# Patient Record
Sex: MALE | Race: WHITE | NOT HISPANIC OR LATINO | Employment: FULL TIME | URBAN - NONMETROPOLITAN AREA
[De-identification: names, ages, dates, MRNs, and addresses within clinical notes are randomized per-mention and may not be internally consistent; named-entity substitution may affect disease eponyms.]

---

## 2017-05-17 ENCOUNTER — OFFICE VISIT (OUTPATIENT)
Dept: URGENT CARE | Facility: PHYSICIAN GROUP | Age: 27
End: 2017-05-17
Payer: COMMERCIAL

## 2017-05-17 VITALS
OXYGEN SATURATION: 99 % | HEART RATE: 65 BPM | TEMPERATURE: 97.2 F | DIASTOLIC BLOOD PRESSURE: 78 MMHG | WEIGHT: 162.2 LBS | RESPIRATION RATE: 16 BRPM | HEIGHT: 71 IN | BODY MASS INDEX: 22.71 KG/M2 | SYSTOLIC BLOOD PRESSURE: 120 MMHG

## 2017-05-17 DIAGNOSIS — H69.91 EUSTACHIAN TUBE DYSFUNCTION, RIGHT: ICD-10-CM

## 2017-05-17 PROCEDURE — 99203 OFFICE O/P NEW LOW 30 MIN: CPT | Performed by: PHYSICIAN ASSISTANT

## 2017-05-17 RX ORDER — GUAIFENESIN AND PSEUDOEPHEDRINE HCL 1200; 120 MG/1; MG/1
1 TABLET, EXTENDED RELEASE ORAL 2 TIMES DAILY PRN
Qty: 30 TAB | Refills: 0 | Status: SHIPPED | OUTPATIENT
Start: 2017-05-17 | End: 2019-01-16

## 2017-05-17 NOTE — MR AVS SNAPSHOT
"Mattian Key   2017 3:50 PM   Office Visit   MRN: [id number]    Department:  [facility]   Dept Phone:  [phone]    Description:  Male : 1990   Provider:  Jerson Soto PA-C           Reason for Visit     Otalgia R ear      Allergies as of 2017     Allergen Noted Reactions    Penicillins 2017   Shortness of Breath      You were diagnosed with     Eustachian tube dysfunction, right   [[id number]]         Vital Signs     Blood Pressure Pulse Temperature Respirations Height Weight    120/78 mmHg 65 36.2 °C (97.2 °F) 16 1.803 m (5' 11\") 73.573 kg (162 lb 3.2 oz)    Body Mass Index Oxygen Saturation Smoking Status             22.63 kg/m2 99% Never Smoker          Basic Information     Date Of Birth Sex Race Ethnicity Preferred Language    1990 Male White Non- English      Health Maintenance     Patient has no pending health maintenance at this time      Current Immunizations     No immunizations on file.      Below and/or attached are the medications your provider expects you to take. Review all of your home medications and newly ordered medications with your provider and/or pharmacist. Follow medication instructions as directed by your provider and/or pharmacist. Please keep your medication list with you and share with your provider. Update the information when medications are discontinued, doses are changed, or new medications (including over-the-counter products) are added; and carry medication information at all times in the event of emergency situations     Allergies:  PENICILLINS - Shortness of Breath               Medications  Valid as of: May 17, 2017 -  4:18 PM    Generic Name Brand Name Tablet Size Instructions for use    Pseudoephedrine-Guaifenesin (TABLET SR 12 HR) Pseudoephedrine-Guaifenesin 120-1200 MG Take 1 Tab by mouth 2 times a day as needed.        .                 Medicines prescribed today were sent to:     [facility] KALA SHERMAN " - [address]    [address] [place] [address]    Phone: [phone] Fax: [phone]    Open 24 Hours?: No      Medication refill instructions:       If your prescription bottle indicates you have medication refills left, it is not necessary to call your provider’s office. Please contact your pharmacy and they will refill your medication.    If your prescription bottle indicates you do not have any refills left, you may request refills at any time through one of the following ways: The online [email or web address] system (except Urgent Care), by calling your provider’s office, or by asking your pharmacy to contact your provider’s office with a refill request. Medication refills are processed only during regular business hours and may not be available until the next business day. Your provider may request additional information or to have a follow-up visit with you prior to refilling your medication.   *Please Note: Medication refills are assigned a new Rx number when refilled electronically. Your pharmacy may indicate that no refills were authorized even though a new prescription for the same medication is available at the pharmacy. Please request the medicine by name with the pharmacy before contacting your provider for a refill.           [email or web address] Access Code: [email or web address]  Expires: 6/16/2017  4:18 PM    Your email address is not on file at [employer].  Email Addresses are required for you to sign up for [email or web address], please contact [phone] to verify your personal information and to provide your email address prior to attempting to register for [email or web address].    [address] [place] [address]    [email or web address]  A secure, online tool to manage your health information     [employer]’s [email or web address]® is a secure, online tool that connects you to your personalized health information from the privacy of your home -- day or night - making it very easy for you to manage your healthcare.  Once the activation process is completed, you can even access your medical information using the [employer] faiza, which is available for free in the Apple Faiza store or Google Play store.     To learn more about [employer] visit [email or web address]    There are two levels of access available (as shown below):   My Chart Features  [facility] Doctor [facility]  Specialists [facility]  Urgent  Care Non-[facility] Primary Care Doctor   Email your healthcare team securely and privately 24/7 X X X    Manage appointments: schedule your next appointment; view details of past/upcoming appointments X      Request prescription refills. X      View recent personal medical records, including lab and immunizations X X X X   View health record, including health history, allergies, medications X X X X   Read reports about your outpatient visits, procedures, consult and ER notes X X X X   See your discharge summary, which is a recap of your hospital and/or ER visit that includes your diagnosis, lab results, and care plan X X  X     How to register for [employer]  Once your e-mail address has been verified, follow the following steps to sign up for [employer]     1. Go to  [email or web address]  2. Click on the Sign Up Now box, which takes you to the New Member Sign Up page. You will need to provide the following information:  a. Enter your [employer] Access Code exactly as it appears at the top of this page. (You will not need to use this code after you’ve completed the sign-up process. If you do not sign up before the expiration date, you must request a new code.)   b. Enter your date of birth.   c. Enter your home email address.   d. Click Submit, and follow the next screen’s instructions.  3. Create a Avitidet ID. This will be your [employer] login ID and cannot be changed, so think of one that is secure and easy to remember.  4. Create a [employer] password. You can change your password at any time.  5. Enter your Password Reset Question and  Answer. This can be used at a later time if you forget your password.   6. Enter your e-mail address. This allows you to receive e-mail notifications when new information is available in [employer].  7. Click Sign Up. You can now view your health information.    For assistance activating your [employer] account, call [phone]

## 2017-05-17 NOTE — PROGRESS NOTES
"Chief Complaint   Patient presents with   • Otalgia     R ear       HISTORY OF PRESENT ILLNESS: Patient is a 27 y.o. male who presents today because he has a 2 to three-day history of right-sided ear pain. Denies any fevers, chills, nausea, vomiting or diarrhea. He has been taking some over-the-counter ibuprofen intermittently and that helps a little bit.    There are no active problems to display for this patient.      Allergies:Penicillins    Current Outpatient Prescriptions Ordered in [facility]   Medication Sig Dispense Refill   • Pseudoephedrine-Guaifenesin (MUCINEX D) 120-1200 MG TABLET SR 12 HR Take 1 Tab by mouth 2 times a day as needed. 30 Tab 0     No current Epic-ordered facility-administered medications on file.       No past medical history on file.    Social History   Substance Use Topics   • Smoking status: Never Smoker    • Smokeless tobacco: None   • Alcohol Use: None       No family status information on file.   No family history on file.    ROS:  Review of Systems   Constitutional: Negative for fever, chills, weight loss and malaise/fatigue.   HENT: Positive for right ear pain, no nosebleeds, congestion, sore throat and neck pain.    Eyes: Negative for blurred vision.   Respiratory: Negative for cough, sputum production, shortness of breath and wheezing.    Cardiovascular: Negative for chest pain, palpitations, orthopnea and leg swelling.     Exam:  Blood pressure 120/78, pulse 65, temperature 36.2 °C (97.2 °F), resp. rate 16, height 1.803 m (5' 11\"), weight 73.573 kg (162 lb 3.2 oz), SpO2 99 %.  General:  Well nourished, well developed male in NAD  Head:Normocephalic, atraumatic  Eyes: PERRLA, EOM within normal limits, no conjunctival injection, no scleral icterus, visual fields and acuity grossly intact.  Ears: Normal shape and symmetry, no tenderness, no discharge. External canals are without any significant edema or erythema. Tympanic membranes are without any inflammation, moderate amount of cloudy " fluid behind the right tympanic membrane. Gross auditory acuity is intact  Nose: Symmetrical without tenderness, no discharge.  Mouth: reasonable hygiene, no erythema exudates or tonsillar enlargement.  Neck: no masses, range of motion within normal limits, no tracheal deviation. No obvious thyroid enlargement.  Pulmonary: chest is symmetrical with respiration, no wheezes, crackles, or rhonchi.  Cardiovascular: regular rate and rhythm without murmurs, rubs, or gallops.  Extremities: no clubbing, cyanosis, or edema.    Please note that this dictation was created using voice recognition software. I have made every reasonable attempt to correct obvious errors, but I expect that there are errors of grammar and possibly content that I did not discover before finalizing the note.    Assessment/Plan:  1. Eustachian tube dysfunction, right  Pseudoephedrine-Guaifenesin (MUCINEX D) 120-1200 MG TABLET SR 12 HR        Followup with primary care in the next 7-10 days if not significantly improving, return to the urgent care or go to the emergency room sooner for any worsening of symptoms.

## 2017-05-17 NOTE — Clinical Note
May 17, 2017         Patient: Skinny Mackey   YOB: 1990   Date of Visit: 5/17/2017           To Whom it May Concern:    Skinny Mackey was seen in my clinic on 5/17/2017. He may return to work on 05/18/2017, please excuse any recent absence.    If you have any questions or concerns, please don't hesitate to call.        Sincerely,           Jerson Soto PA-C  Electronically Signed

## 2017-07-13 ENCOUNTER — NON-PROVIDER VISIT (OUTPATIENT)
Dept: URGENT CARE | Facility: PHYSICIAN GROUP | Age: 27
End: 2017-07-13

## 2017-07-13 DIAGNOSIS — Z02.1 PRE-EMPLOYMENT DRUG SCREENING: ICD-10-CM

## 2017-07-13 LAB
AMP AMPHETAMINE: NORMAL
COC COCAINE: NORMAL
INT CON NEG: NORMAL
INT CON POS: NORMAL
MET METHAMPHETAMINES: NORMAL
OPI OPIATES: NORMAL
PCP PHENCYCLIDINE: NORMAL
POC DRUG COMMENT 753798-OCCUPATIONAL HEALTH: NORMAL
THC: NORMAL

## 2017-07-13 PROCEDURE — 80305 DRUG TEST PRSMV DIR OPT OBS: CPT | Performed by: PHYSICIAN ASSISTANT

## 2017-07-19 ENCOUNTER — NON-PROVIDER VISIT (OUTPATIENT)
Dept: OCCUPATIONAL MEDICINE | Facility: CLINIC | Age: 27
End: 2017-07-19
Payer: COMMERCIAL

## 2017-07-19 ENCOUNTER — NON-PROVIDER VISIT (OUTPATIENT)
Dept: OCCUPATIONAL MEDICINE | Facility: CLINIC | Age: 27
End: 2017-07-19

## 2017-07-19 DIAGNOSIS — Z02.83 ENCOUNTER FOR DRUG SCREENING: ICD-10-CM

## 2017-07-19 PROCEDURE — 8911 PR MRO FEE: Performed by: INTERNAL MEDICINE

## 2017-07-19 NOTE — MR AVS SNAPSHOT
Skinny Mackey   2017 11:20 AM   Appointment   MRN: [id number]    Department:  [facility]   Dept Phone:  [phone]    Description:  Male : 1990   Provider:  OH NON RENCEASAR SUAREZ           Allergies as of 2017     Allergen Noted Reactions    Penicillins 2017   Shortness of Breath      Vital Signs     Smoking Status                   Never Smoker            Basic Information     Date Of Birth Sex Race Ethnicity Preferred Language    1990 Male White Non- English      Health Maintenance        Date Due Completion Dates    IMM HEP B VACCINE (1 of 3 - Primary Series) 1990 ---    IMM HEP A VACCINE (1 of 2 - Standard Series) 5/10/1992 ---    IMM HPV VACCINE (1 of 3 - Male 3 Dose Series) 5/10/2002 ---    IMM VARICELLA (CHICKENPOX) VACCINE (1 of 2 - 2 Dose Adolescent Series) 5/10/2004 ---    IMM DTaP/Tdap/Td Vaccine (1 - Tdap) 5/10/2010 ---    IMM INFLUENZA (1) 2017 ---            Current Immunizations     No immunizations on file.      Below and/or attached are the medications your provider expects you to take. Review all of your home medications and newly ordered medications with your provider and/or pharmacist. Follow medication instructions as directed by your provider and/or pharmacist. Please keep your medication list with you and share with your provider. Update the information when medications are discontinued, doses are changed, or new medications (including over-the-counter products) are added; and carry medication information at all times in the event of emergency situations     Allergies:  PENICILLINS - Shortness of Breath               Medications  Valid as of: 2017 - 10:41 AM    Generic Name Brand Name Tablet Size Instructions for use    Pseudoephedrine-Guaifenesin (TABLET SR 12 HR) Pseudoephedrine-Guaifenesin 120-1200 MG Take 1 Tab by mouth 2 times a day as needed.        .                 Medicines prescribed today were sent to:     [facility] [address] [place] [address] - [address]    [address] [address]    Phone: [phone] Fax: [phone]    Open 24 Hours?: No      Medication refill instructions:       If your prescription bottle indicates you have medication refills left, it is not necessary to call your provider’s office. Please contact your pharmacy and they will refill your medication.    If your prescription bottle indicates you do not have any refills left, you may request refills at any time through one of the following ways: The online [employer] system (except Urgent Care), by calling your provider’s office, or by asking your pharmacy to contact your provider’s office with a refill request. Medication refills are processed only during regular business hours and may not be available until the next business day. Your provider may request additional information or to have a follow-up visit with you prior to refilling your medication.   *Please Note: Medication refills are assigned a new Rx number when refilled electronically. Your pharmacy may indicate that no refills were authorized even though a new prescription for the same medication is available at the pharmacy. Please request the medicine by name with the pharmacy before contacting your provider for a refill.           [employer] Access Code: [email or web address]  Expires: 8/12/2017  1:40 PM    [employer]  A secure, online tool to manage your health information     [employer]’s [employer]® is a secure, online tool that connects you to your personalized health information from the privacy of your home -- day or night - making it very easy for you to manage your healthcare. Once the activation process is completed, you can even access your medical information using the [employer] faiza, which is available for free in the Apple Faiza store or Google Play store.     [employer] provides the following levels of access (as shown below):   My Chart Features   [facility]  Primary Care Doctor [facility]  Specialists [facility]  Urgent  Care Non-[facility]  Primary Care  Doctor   Email your healthcare team securely and privately 24/7 X X X    Manage appointments: schedule your next appointment; view details of past/upcoming appointments X      Request prescription refills. X      View recent personal medical records, including lab and immunizations X X X X   View health record, including health history, allergies, medications X X X X   Read reports about your outpatient visits, procedures, consult and ER notes X X X X   See your discharge summary, which is a recap of your hospital and/or ER visit that includes your diagnosis, lab results, and care plan. X X       How to register for Justin.TV:  1. Go to  [email or web address].  2. Click on the Sign Up Now box, which takes you to the New Member Sign Up page. You will need to provide the following information:  a. Enter your Justin.TV Access Code exactly as it appears at the top of this page. (You will not need to use this code after you’ve completed the sign-up process. If you do not sign up before the expiration date, you must request a new code.)   b. Enter your date of birth.   c. Enter your home email address.   d. Click Submit, and follow the next screen’s instructions.  3. Create a Justin.TV ID. This will be your Justin.TV login ID and cannot be changed, so think of one that is secure and easy to remember.  4. Create a Justin.TV password. You can change your password at any time.  5. Enter your Password Reset Question and Answer. This can be used at a later time if you forget your password.   6. Enter your e-mail address. This allows you to receive e-mail notifications when new information is available in Justin.TV.  7. Click Sign Up. You can now view your health information.    For assistance activating your Justin.TV account, call [phone]

## 2017-08-08 NOTE — PROGRESS NOTES
Non-conclusive UDS follow up.  MRO confirmation fees need to be charged.  MRO report date 7/19/17

## 2018-06-18 ENCOUNTER — NON-PROVIDER VISIT (OUTPATIENT)
Dept: URGENT CARE | Facility: PHYSICIAN GROUP | Age: 28
End: 2018-06-18

## 2018-06-18 DIAGNOSIS — Z02.1 PRE-EMPLOYMENT DRUG SCREENING: ICD-10-CM

## 2018-06-18 LAB
AMP AMPHETAMINE: NORMAL
BAR BARBITURATES: NORMAL
BZO BENZODIAZEPINES: NORMAL
COC COCAINE: NORMAL
INT CON NEG: NORMAL
INT CON POS: NORMAL
MDMA ECSTASY: NORMAL
MET METHAMPHETAMINES: NORMAL
MTD METHADONE: NORMAL
OPI OPIATES: NORMAL
OXY OXYCODONE: NORMAL
PCP PHENCYCLIDINE: NORMAL
POC URINE DRUG SCREEN OCDRS: NORMAL
THC: NORMAL

## 2018-06-18 PROCEDURE — 80305 DRUG TEST PRSMV DIR OPT OBS: CPT | Performed by: PHYSICIAN ASSISTANT

## 2018-06-29 ENCOUNTER — NON-PROVIDER VISIT (OUTPATIENT)
Dept: URGENT CARE | Facility: PHYSICIAN GROUP | Age: 28
End: 2018-06-29

## 2018-06-29 DIAGNOSIS — Z02.1 PRE-EMPLOYMENT DRUG SCREENING: ICD-10-CM

## 2018-06-29 LAB
AMP AMPHETAMINE: NORMAL
COC COCAINE: NORMAL
INT CON NEG: NEGATIVE
INT CON POS: POSITIVE
MET METHAMPHETAMINES: NORMAL
OPI OPIATES: NORMAL
PCP PHENCYCLIDINE: NORMAL
POC DRUG COMMENT 753798-OCCUPATIONAL HEALTH: NORMAL
THC: NORMAL

## 2018-06-29 PROCEDURE — 80305 DRUG TEST PRSMV DIR OPT OBS: CPT | Performed by: FAMILY MEDICINE

## 2018-07-09 ENCOUNTER — NON-PROVIDER VISIT (OUTPATIENT)
Dept: OCCUPATIONAL MEDICINE | Facility: CLINIC | Age: 28
End: 2018-07-09

## 2018-07-09 PROCEDURE — 8911 PR MRO FEE: Performed by: INTERNAL MEDICINE

## 2018-10-28 ENCOUNTER — APPOINTMENT (OUTPATIENT)
Dept: RADIOLOGY | Facility: MEDICAL CENTER | Age: 28
End: 2018-10-28
Attending: EMERGENCY MEDICINE

## 2018-10-28 ENCOUNTER — HOSPITAL ENCOUNTER (EMERGENCY)
Facility: MEDICAL CENTER | Age: 28
End: 2018-10-28
Attending: EMERGENCY MEDICINE

## 2018-10-28 VITALS
SYSTOLIC BLOOD PRESSURE: 134 MMHG | WEIGHT: 158.73 LBS | HEIGHT: 71 IN | OXYGEN SATURATION: 99 % | DIASTOLIC BLOOD PRESSURE: 79 MMHG | HEART RATE: 84 BPM | BODY MASS INDEX: 22.22 KG/M2 | TEMPERATURE: 98.2 F | RESPIRATION RATE: 16 BRPM

## 2018-10-28 DIAGNOSIS — S63.91XA SPRAIN OF RIGHT HAND, INITIAL ENCOUNTER: ICD-10-CM

## 2018-10-28 DIAGNOSIS — S63.501A SPRAIN OF RIGHT WRIST, INITIAL ENCOUNTER: ICD-10-CM

## 2018-10-28 PROCEDURE — 73120 X-RAY EXAM OF HAND: CPT | Mod: RT

## 2018-10-28 PROCEDURE — 73100 X-RAY EXAM OF WRIST: CPT | Mod: RT

## 2018-10-28 PROCEDURE — 99284 EMERGENCY DEPT VISIT MOD MDM: CPT

## 2018-10-28 ASSESSMENT — PAIN SCALES - GENERAL
PAINLEVEL_OUTOF10: 4
PAINLEVEL_OUTOF10: 5

## 2018-10-28 NOTE — ED TRIAGE NOTES
"Chief Complaint   Patient presents with   • T-5000 GLF     tripped on R hand   • Hand Injury     R wrist, hand pain     Limited ROM to R wrist.  Pulses strong.     /68   Pulse 86   Temp 36.8 °C (98.2 °F)   Resp 16   Ht 1.803 m (5' 11\")   Wt 72 kg (158 lb 11.7 oz)   SpO2 99%   BMI 22.14 kg/m²     Pt Informed regarding triage process and verbalized understanding to inform triage tech or RN for any changes in condition.  Placed in lobby.    "

## 2018-10-29 NOTE — DISCHARGE INSTRUCTIONS
Joint Sprain  A sprain is a tear or stretch in the ligaments that hold a joint together. Severe sprains may need as long as 3-6 weeks of immobilization and/or exercises to heal completely. Sprained joints should be rested and protected. If not, they can become unstable and prone to re-injury. Proper treatment can reduce your pain, shorten the period of disability, and reduce the risk of repeated injuries.  TREATMENT   · Rest and elevate the injured joint to reduce pain and swelling.  · Apply ice packs to the injury for 20-30 minutes every 2-3 hours for the next 2-3 days.  · Keep the injury wrapped in a compression bandage or splint as long as the joint is painful or as instructed by your caregiver.  · Do not use the injured joint until it is completely healed to prevent re-injury and chronic instability. Follow the instructions of your caregiver.  · Long-term sprain management may require exercises and/or treatment by a physical therapist. Taping or special braces may help stabilize the joint until it is completely better.  SEEK MEDICAL CARE IF:   · You develop increased pain or swelling of the joint.  · You develop increasing redness and warmth of the joint.  · You develop a fever.  · It becomes stiff.  · Your hand or foot gets cold or numb.  Document Released: 01/25/2006 Document Revised: 03/11/2013 Document Reviewed: 01/04/2010  [employer]® Patient Information ©2014 [employer].

## 2018-10-29 NOTE — ED PROVIDER NOTES
"ED Provider Note  CHIEF COMPLAINT  Chief Complaint   Patient presents with   • T-5000 GLF     tripped on R hand   • Hand Injury     R wrist, hand pain       HPI  Skinny Mackey is a 28 y.o. male who presents planes of pain to his right hand and right wrist after tripping with a ground-level fall today.  No other injury.  No injury to the head and of the neck near the chest.  No injury to the back nor the other extremities.    REVIEW OF SYSTEMS  See HPI for further details.  Otherwise healthy no other complaint.  No injury to the head and of the neck of the chest and of the other extremities.  PAST MEDICAL HISTORY  Negative    FAMILY HISTORY  Negative    SOCIAL HISTORY  Negative    ALLERGIES    PHYSICAL EXAM  VITAL SIGNS: /68   Pulse 86   Temp 36.8 °C (98.2 °F)   Resp 16   Ht 1.803 m (5' 11\")   Wt 72 kg (158 lb 11.7 oz)   SpO2 99%   BMI 22.14 kg/m²   vital signs are noted  Constitutional: Alert healthy-appearing adult in no distress   HENT: Normocephalic   Eyes: No sign of trauma  Neck: No neck pain  Cardiovascular: Regular rhythm   Thorax & Lungs: No respiratory distress   Abdomen: No pain  Skin: Warm, Dry, No rash.   Back: No back pain or tenderness    Musculoskeletal: Into the right wrist and right hand.  No obvious deformity or swelling.  Good radial pulse.  Full range of motion of the hand and wrist.  Neurologic: Alert  Normal motor function,  No obvious focal deficits noted.   Psychiatric: Affect normal, and mood normal.       RADIOLOGY/PROCEDURES  DX-WRIST-LIMITED 2- RIGHT   Final Result      No acute osseous abnormality.      DX-HAND 2- RIGHT   Final Result      No acute osseous abnormality.            COURSE & MEDICAL DECISION MAKING  Fall and injured his right hand and wrist.  An x-ray of the right hand and wrist have been obtained.    X-ray of the hand and wrist shows no fracture.  He has a right wrist sprain.  Velcro wrist splint has been applied and is in good position.  He can take Tylenol " or Motrin for the pain.  He is been given orthopedic follow-up.  FINAL IMPRESSION  1.  Sprain         Electronically signed by: Gary Gansert, 10/28/2018 5:09 PM

## 2019-01-16 ENCOUNTER — OFFICE VISIT (OUTPATIENT)
Dept: URGENT CARE | Facility: CLINIC | Age: 29
End: 2019-01-16
Payer: COMMERCIAL

## 2019-01-16 VITALS
OXYGEN SATURATION: 97 % | TEMPERATURE: 98.3 F | HEART RATE: 96 BPM | WEIGHT: 152 LBS | SYSTOLIC BLOOD PRESSURE: 140 MMHG | DIASTOLIC BLOOD PRESSURE: 86 MMHG | RESPIRATION RATE: 14 BRPM | BODY MASS INDEX: 22.51 KG/M2 | HEIGHT: 69 IN

## 2019-01-16 DIAGNOSIS — Z02.1 PRE-EMPLOYMENT DRUG SCREENING: ICD-10-CM

## 2019-01-16 DIAGNOSIS — Z02.1 PHYSICAL EXAM, PRE-EMPLOYMENT: ICD-10-CM

## 2019-01-16 PROCEDURE — 8907 PR URINE COLLECT ONLY: Performed by: NURSE PRACTITIONER

## 2019-01-16 PROCEDURE — 8915 PR COMPREHENSIVE PHYSICAL: Performed by: NURSE PRACTITIONER

## 2019-01-16 NOTE — PROGRESS NOTES
28 y.o. male comes in for a preemployment physical. No major medical history, no chronic conditions, no chronic medications. No history of asthma, heart disease, murmurs, seizure disorder or syncopal episodes with activity. Blood pressure slightly elevated today, denies history of the same. Instructed to decrease salt in diet, perform daily exercise, and follow up with PCP. Please see the chart for further information, however normal physical exam, cleared for employment without restrictions.        FRANDY Jones.

## 2020-01-15 ENCOUNTER — OFFICE VISIT (OUTPATIENT)
Dept: URGENT CARE | Facility: PHYSICIAN GROUP | Age: 30
End: 2020-01-15
Payer: COMMERCIAL

## 2020-01-15 VITALS
DIASTOLIC BLOOD PRESSURE: 82 MMHG | TEMPERATURE: 98.2 F | HEIGHT: 71 IN | OXYGEN SATURATION: 97 % | WEIGHT: 155 LBS | RESPIRATION RATE: 12 BRPM | SYSTOLIC BLOOD PRESSURE: 152 MMHG | BODY MASS INDEX: 21.7 KG/M2 | HEART RATE: 82 BPM

## 2020-01-15 DIAGNOSIS — G43.809 OTHER MIGRAINE WITHOUT STATUS MIGRAINOSUS, NOT INTRACTABLE: ICD-10-CM

## 2020-01-15 PROCEDURE — 99214 OFFICE O/P EST MOD 30 MIN: CPT | Performed by: PHYSICIAN ASSISTANT

## 2020-01-15 RX ORDER — OXYCODONE AND ACETAMINOPHEN 2.5; 325 MG/1; MG/1
TABLET ORAL
COMMUNITY
Start: 2019-10-30 | End: 2023-04-12

## 2020-01-15 RX ORDER — ONDANSETRON 4 MG/1
4 TABLET, FILM COATED ORAL EVERY 8 HOURS PRN
Qty: 20 TAB | Refills: 0 | Status: SHIPPED | OUTPATIENT
Start: 2020-01-15 | End: 2023-04-12

## 2020-01-15 RX ORDER — KETOROLAC TROMETHAMINE 30 MG/ML
60 INJECTION, SOLUTION INTRAMUSCULAR; INTRAVENOUS ONCE
Status: COMPLETED | OUTPATIENT
Start: 2020-01-15 | End: 2020-01-15

## 2020-01-15 RX ORDER — HYDROCODONE BITARTRATE AND ACETAMINOPHEN 5; 325 MG/1; MG/1
TABLET ORAL
COMMUNITY
Start: 2019-11-03 | End: 2023-04-12

## 2020-01-15 RX ORDER — CLINDAMYCIN HYDROCHLORIDE 300 MG/1
CAPSULE ORAL
COMMUNITY
Start: 2019-10-30 | End: 2020-04-12

## 2020-01-15 RX ADMIN — KETOROLAC TROMETHAMINE 60 MG: 30 INJECTION, SOLUTION INTRAMUSCULAR; INTRAVENOUS at 13:18

## 2020-01-15 NOTE — LETTER
January 15, 2020         Patient: Skinny Mackey   YOB: 1990   Date of Visit: 1/15/2020           To Whom it May Concern:    Skinny Mackey was seen in my clinic on 1/15/2020. He Return to work 01/16/20.    If you have any questions or concerns, please don't hesitate to call.        Sincerely,           Jerson Soto P.A.-C.  Electronically Signed

## 2020-01-15 NOTE — PROGRESS NOTES
Chief Complaint   Patient presents with   • Head Pain     x 1 day.  Pt. complains of nausea and migraine with aura.         HISTORY OF PRESENT ILLNESS: Patient is a 29 y.o. male who presents today because he woke up about an hour and a half or so ago with a migraine.  He states that he has been battling migraines for years.  His current symptoms are head pain bilaterally and behind his eyes.  He has had nausea, photophobia.  Denies any numbness or tingling in his face or extremities.  He has taken some Excedrin for which did not help    There are no active problems to display for this patient.      Allergies:Penicillins    Current Outpatient Medications Ordered in Epic   Medication Sig Dispense Refill   • asa/apap/caffeine (EXCEDRIN) 250-250-65 MG Tab Take 1 Tab by mouth every 6 hours as needed for Headache.     • ondansetron (ZOFRAN) 4 MG Tab tablet Take 1 Tab by mouth every 8 hours as needed for Nausea/Vomiting. 20 Tab 0     Current Facility-Administered Medications Ordered in Epic   Medication Dose Route Frequency Provider Last Rate Last Dose   • ketorolac (TORADOL) injection 60 mg  60 mg Intramuscular Once Jerson Soto P.A.-C.           History reviewed. No pertinent past medical history.    Social History     Tobacco Use   • Smoking status: Never Smoker   • Smokeless tobacco: Never Used   Substance Use Topics   • Alcohol use: No   • Drug use: No       No family status information on file.   History reviewed. No pertinent family history.    ROS:  Review of Systems   Constitutional: Negative for fever, chills, weight loss and malaise/fatigue.   HENT: Negative for ear pain, nosebleeds, congestion, sore throat and neck pain.    Eyes: Negative for blurred vision.   Respiratory: Negative for cough, sputum production, shortness of breath and wheezing.    Cardiovascular: Negative for chest pain, palpitations, orthopnea and leg swelling.   Gastrointestinal: Negative for heartburn, positive for nausea, no vomiting  "and abdominal pain.   Genitourinary: Negative for dysuria, urgency and frequency.     Exam:  /82   Pulse 82   Temp 36.8 °C (98.2 °F) (Temporal)   Resp 12   Ht 1.803 m (5' 11\")   Wt 70.3 kg (155 lb)   SpO2 97%   General:  Well nourished, well developed male in NAD  Head:Normocephalic, atraumatic  Eyes: PERRLA, EOM within normal limits, no conjunctival injection, no scleral icterus, visual fields and acuity grossly intact.  Nose: Symmetrical without tenderness, no discharge.  Mouth: reasonable hygiene, no erythema exudates or tonsillar enlargement.  Neck: no masses, range of motion within normal limits, no tracheal deviation. No obvious thyroid enlargement.  Extremities: no clubbing, cyanosis, or edema.    Please note that this dictation was created using voice recognition software. I have made every reasonable attempt to correct obvious errors, but I expect that there are errors of grammar and possibly content that I did not discover before finalizing the note.    Assessment/Plan:  1. Other migraine without status migrainosus, not intractable  ketorolac (TORADOL) injection 60 mg    ondansetron (ZOFRAN) 4 MG Tab tablet       Followup with primary care in the next 7-10 days if not significantly improving, return to the urgent care or go to the emergency room sooner for any worsening of symptoms.       "

## 2020-02-03 ENCOUNTER — OFFICE VISIT (OUTPATIENT)
Dept: URGENT CARE | Facility: PHYSICIAN GROUP | Age: 30
End: 2020-02-03
Payer: COMMERCIAL

## 2020-02-03 VITALS
BODY MASS INDEX: 20.86 KG/M2 | DIASTOLIC BLOOD PRESSURE: 88 MMHG | HEART RATE: 80 BPM | RESPIRATION RATE: 16 BRPM | WEIGHT: 154 LBS | SYSTOLIC BLOOD PRESSURE: 148 MMHG | HEIGHT: 72 IN | OXYGEN SATURATION: 97 % | TEMPERATURE: 98 F

## 2020-02-03 DIAGNOSIS — J06.9 UPPER RESPIRATORY TRACT INFECTION, UNSPECIFIED TYPE: ICD-10-CM

## 2020-02-03 PROCEDURE — 99213 OFFICE O/P EST LOW 20 MIN: CPT | Performed by: PHYSICIAN ASSISTANT

## 2020-02-03 NOTE — LETTER
February 3, 2020         Patient: Skinny Mackey   YOB: 1990   Date of Visit: 2/3/2020           To Whom it May Concern:    Skinny Mackey was seen in my clinic on 2/3/2020. He may return to work on 2/4/2020.    If you have any questions or concerns, please don't hesitate to call.        Sincerely,           Nancy Landeros P.A.-C.  Electronically Signed

## 2020-02-03 NOTE — PROGRESS NOTES
"Chief Complaint   Patient presents with   • Cough     Cough/URI Sx started this morning.        HISTORY OF PRESENT ILLNESS: Patient is a 29 y.o. male who presents today for the following:    Cough started this morning  Temp \"almost 100\" this morning  + nasal congestion, head pressure  Mild chill  Denies body aches  OTC Meds: ibuprofen/APAP    There are no active problems to display for this patient.      Allergies:Penicillins    Current Outpatient Medications Ordered in Epic   Medication Sig Dispense Refill   • asa/apap/caffeine (EXCEDRIN) 250-250-65 MG Tab Take 1 Tab by mouth every 6 hours as needed for Headache.     • ondansetron (ZOFRAN) 4 MG Tab tablet Take 1 Tab by mouth every 8 hours as needed for Nausea/Vomiting. (Patient not taking: Reported on 2/3/2020) 20 Tab 0   • oxycodone-acetaminophen (PERCOCET) 2.5-325 MG per tablet      • HYDROcodone-acetaminophen (NORCO) 5-325 MG Tab per tablet      • clindamycin (CLEOCIN) 300 MG Cap        No current Epic-ordered facility-administered medications on file.        History reviewed. No pertinent past medical history.    Social History     Tobacco Use   • Smoking status: Never Smoker   • Smokeless tobacco: Never Used   Substance Use Topics   • Alcohol use: No   • Drug use: No       No family status information on file.   History reviewed. No pertinent family history.    Review of Systems:   Constitutional ROS: No unexpected change in weight, No weakness, No fatigue  Eye ROS: No recent significant change in vision, No eye pain, redness, discharge  Ear ROS: No drainage, No tinnitus or vertigo, No recent change in hearing  Mouth/Throat ROS: No teeth or gum problems, No bleeding gums, No tongue complaints  Neck ROS: No swollen glands, No significant pain in neck  Pulmonary ROS: No chronic cough, sputum, or hemoptysis, No dyspnea on exertion, No wheezing  Cardiovascular ROS: No diaphoresis, No edema, No palpitations  Musculoskeletal/Extremities ROS: No peripheral edema, No " pain, redness or swelling on the joints  Hematologic/Lymphatic ROS: chills, fever  Skin/Integumentary ROS: No edema, No evidence of rash, No itching      Exam:  /88   Pulse 80   Temp 36.7 °C (98 °F) (Temporal)   Resp 16   Ht 1.829 m (6')   Wt 69.9 kg (154 lb)   SpO2 97%   General: Well developed, well nourished. No distress.    Eye: PERRL/EOMI; conjunctivae clear, lids normal.  ENMT: Lips without lesions, MMM. Oropharynx is clear. Bilateral TMs are within normal limits.  Pulmonary: Unlabored respiratory effort. Lungs clear to auscultation, no wheezes, no rhonchi.    Cardiovascular: Regular rate and rhythm without murmur.   Neurologic: Grossly nonfocal. No facial asymmetry noted.  Lymph: No cervical lymphadenopathy noted.  Skin: Warm, dry, good turgor. No rashes in visible areas.   Psych: Normal mood. Alert and oriented to person, place and time.    Assessment/Plan:  Discussed likely viral etiology, possibly influenza.  Patient symptoms are relatively mild.  Discussed CDC recommendations for Tamiflu treatment and patient declines treatment. Discussed appropriate over-the-counter symptomatic medication, and when to return to clinic. Follow up for worsening or persistent symptoms.  1. Upper respiratory tract infection, unspecified type     '

## 2020-09-09 ENCOUNTER — OCCUPATIONAL MEDICINE (OUTPATIENT)
Dept: URGENT CARE | Facility: CLINIC | Age: 30
End: 2020-09-09
Payer: COMMERCIAL

## 2020-09-09 VITALS
TEMPERATURE: 98.4 F | DIASTOLIC BLOOD PRESSURE: 80 MMHG | BODY MASS INDEX: 21.67 KG/M2 | OXYGEN SATURATION: 98 % | HEART RATE: 82 BPM | SYSTOLIC BLOOD PRESSURE: 138 MMHG | RESPIRATION RATE: 14 BRPM | HEIGHT: 72 IN | WEIGHT: 160 LBS

## 2020-09-09 DIAGNOSIS — M75.41 IMPINGEMENT SYNDROME OF RIGHT SHOULDER: ICD-10-CM

## 2020-09-09 PROCEDURE — 99203 OFFICE O/P NEW LOW 30 MIN: CPT | Performed by: PHYSICIAN ASSISTANT

## 2020-09-09 NOTE — LETTER
[facility]  [address], [address] [place] [address]  Phone:  [phone] - Fax:  [phone]   [facility] Progress Report and Disability Certification  Date of Service: 9/9/2020   No Show:  No  Date / Time of Next Visit:     Claim Information   Patient Name: Skinny Mackey  Claim Number:     Employer: [employer]  Date of Injury: 9/9/2020     Insurer / TPA: Mell Macedon  ID / SSN:     Occupation: /  Diagnosis: There were no encounter diagnoses.    Medical Information   Related to Industrial Injury? Yes    Subjective Complaints:  HPI:  DOI: 9/9/2020  JOSE: The patient was at work today at [employer] when the injury occurred.  He was walking carrying something in his left hand and swinging his right shoulder.  He felt a pop and a sharp pain in his right shoulder.  The sharp pain gradually subsided and turned to a dull ache.  Currently he denies any numbness tingling weakness or loss of range of motion to the shoulder.  He states his pain has greatly improved and he is in minimal pain at this time.  He informs me his boss recommended he come into the clinic and be checked out today.  There was no traumatic event leading to this injury.  Patient informs me he does have a past medical history significant for right shoulder dislocation back in 2015.  He has had no previous surgeries on the shoulder.  The patient is right-handed.  He denies any second job.    PMH:   No pertinent past medical history to this problem  MEDS:  Medications were reviewed in EMR  ALLERGIES:  Allergies were reviewed in EMR  SOCHX:  Works at [employer]  FH:   No pertinent family history to this problem    Review of systems:  Constitutional: Alert, no acute distress, normal appearance  Cardiovascular: Regular rate and rhythm  Pulmonary: Normal breath sounds no wheezing  Musculoskeletal: Right shoulder pain     Objective Findings:  Constitutional: Pt is oriented to person, place, and time.  Appears well-developed and well-nourished. No distress.   HENT: WNL  Mouth/Throat: Oropharynx is clear and moist.   Eyes: Conjunctivae are normal.   Cardiovascular: Normal rate.    Pulmonary/Chest: Effort normal.   Musculoskeletal: Right shoulder: No obvious deformities, effusion, ecchymosis or erythema.  No tenderness to palpation over any of the bony prominences.  Slight tenderness to palpation over the lateral glenohumeral joint.  Patient has full shoulder flexion/extension/internal and external rotation.  Strength is 5/5 to shoulder flexion/extension/bicep flexion/abduction.  Bilateral upper extremity sensation is full and intact.  Speeds test negative.  Empty can test negative.  Apprehension test negative.  Neurological: Pt is alert and oriented to person, place, and time. Coordination normal.   Skin: Skin is warm. Pt is not diaphoretic. No erythema.   Psychiatric: Pt has a normal mood and affect.  Behavior is normal.      Pre-Existing Condition(s): Right shoulder dislocation in 2015.   Assessment:   Initial Visit    Status: Discharged /  MMI  Permanent Disability:No    Plan:      Diagnostics:      Comments:  May use Tylenol and ibuprofen as needed for pain and inflammation.  Recommended icing 3-4 times a day for 10 to 15 minutes for the next 2 to 3 days.  Recommended stretching and gentle range of motion exercises.  No physical restrictions or limitation  s.  Follow-up in clinic if symptoms worsen or persist.    Disability Information   Status: Released to Full Duty    From:     Through:   Restrictions are:     Physical Restrictions   Sitting:    Standing:    Stooping:    Bending:      Squatting:    Walking:    Climbing:    Pushing:      Pulling:    Other:    Reaching Above Shoulder (L):   Reaching Above Shoulder (R):       Reaching Below Shoulder (L):    Reaching Below Shoulder (R):      Not to exceed Weight Limits   Carrying(hrs):   Weight  Limit(lb):   Lifting(hrs):   Weight  Limit(lb):     Comments: Released to full duty.    Repetitive Actions   Hands: i.e. Fine Manipulations from Grasping:     Feet: i.e. Operating Foot Controls:     Driving / Operate Machinery:     Provider Name:   Rohit Arzola P.A.-C. Physician Signature:  Physician Name:     Clinic Name / Location: [address]  [place] [address] Clinic Phone Number: Dept: [phone]   Appointment Time: 1:15 Pm Visit Start Time: 1:34 PM   Check-In Time:  1:29 Pm Visit Discharge Time: 2:20 PM   Original-Treating Physician or Chiropractor    Page 2-Insurer/TPA    Page 3-Employer    Page 4-Employee

## 2020-09-09 NOTE — LETTER
EMPLOYEE’S CLAIM FOR COMPENSATION/ REPORT OF INITIAL TREATMENT  FORM C-4    EMPLOYEE’S CLAIM - PROVIDE ALL INFORMATION REQUESTED   First Name  Skinny Last Name  Key Birthdate                    1990                Sex  male Claim Number   Home Address  [address] Age  30 y.o. Height  1.829 m (6') Weight  72.6 kg (160 lb) N     [facility] Zip  [address] Telephone  [phone] (home) [phone] (work)   Mailing Address  [address] [facility] Zip  [address] Primary Language Spoken  English    Insurer   Third Party   Shaina/ester Berman   Employee's Occupation (Job Title) When Injury or Occupational Disease Occurred  /    Employer's Name  [employer]  Telephone  [phone]    Employer Address  [address] [place]  [facility]  Zip  [address]    Date of Injury  9/9/2020               Hour of Injury  12:00 PM Date Employer Notified  9/9/2020 Last Day of Work after Injury     or Occupational Disease  9/9/2020 Supervisor to Whom Injury     Reported  Edvin   Address or Location of Accident (if applicable)  [[employer]   What were you doing at the time of accident? (if applicable)  walking/swaying arms    How did this injury or occupational disease occur? (Be specific an answer in detail. Use additional sheet if necessary)  walking/swaying arms them shoulder popped   If you believe that you have an occupational disease, when did you first have knowledge of the disability and it relationship to your employment?  N/A Witnesses to the Accident  none      Nature of Injury or Occupational Disease  Sprain  Part(s) of Body Injured or Affected  Shoulder (R), N/A, N/A    I certify that the above is true and correct to the best of my knowledge and that I have provided this information in order to obtain the benefits of [place]  [place]  I hereby authorize any physician, chiropractor, surgeon, practitioner, or other person, any hospital, including [facility] or [facility], any medical service organization, any insurance company, or other institution or organization to release to each other, any medical or other information, including benefits paid or payable, pertinent to this injury or disease, except information relative to diagnosis, treatment and/or counseling for AIDS, psychological conditions, alcohol or controlled substances, for which I must give specific authorization.  A Photostat of this authorization shall be as valid as the original.     Date   Place   Employee’s Signature   THIS REPORT MUST BE COMPLETED AND MAILED WITHIN 3 WORKING DAYS OF TREATMENT   Place  [facility]  [place]  [facility]   Date  9/9/2020 Diagnosis  No diagnosis found. Is there evidence the injured employee was under the              influence of alcohol and/or another controlled substance at the time of accident?   Hour  1:34 PM Description of Injury or Disease  There were no encounter diagnoses. No   Treatment  May use Tylenol and ibuprofen as needed for pain and inflammation.  Recommended icing 3-4 times a day for 10 to 15 minutes for the next 2 to 3 days.  Recommended stretching and gentle range of motion exercises.  No physical restrictions or limitations.  Follow-up in clinic if symptoms worsen or persist.  Have you advised the patient to remain off work five days or     more? No   X-Ray Findings      If Yes   From Date  To Date      From information given by the employee, together with medical evidence, can you directly connect this injury or occupational disease as job incurred?  Yes If No Full Duty    Yes Modified Duty      Is additional medical care by a physician indicated?  No If Modified Duty, Specify any Limitations  "/ Restrictions      Do you know of any previous injury or disease contributing to this condition or occupational disease?                            Yes  Comments:Patient has previously dislocated his right shoulder back in 2015.  There was no surgical intervention.   Date  9/9/2020 Print Doctor’s Name   Nu Arzola P.A.-C. I certify the employer’s copy of  this form was mailed on:   Address  [address], [address] Insurer’s Use Only     [facility] Zip  [address]    Provider’s Tax ID Number  [id number] Telephone  Dept: [phone]      [email or web address]-NU Ospina P.A.-C.  Signature:     Degree          ORIGINAL-TREATING PHYSICIAN OR CHIROPRACTOR    PAGE 2-INSURER/TPA    PAGE 3-EMPLOYER    PAGE 4-EMPLOYEE        Form C-4 (rev.10/07)          BRIEF DESCRIPTION OF RIGHTS AND BENEFITS  (Pursuant to [place] 616[place])    Notice of Injury or Occupational Disease (Incident Report Form C-1): If an injury or occupational disease (OD) arises out of and in the course of employment, you must provide written notice to your employer as soon as practicable, but no later than 7 days after the accident or OD. Your employer shall maintain a sufficient supply of the required forms.     Claim for Compensation (Form C-4): If medical treatment is sought, the form C-4 is available at the place of initial treatment. A completed \"Claim for Compensation\" (Form C-4) must be filed within 90 days after an accident or OD. The treating physician or chiropractor must, within 3 working days after treatment, complete and mail to the employer, the employer's insurer and third-party , the Claim for Compensation.     Medical Treatment: If you require medical treatment for your on-the-job injury or OD, you may be required to select a physician or chiropractor from a list provided by your workers’ compensation insurer, if it has contracted with an Organization for Managed Care (MCO) or Preferred Provider Organization (PPO) or " providers of health care. If your employer has not entered into a contract with an MCO or PPO, you may select a physician or chiropractor from the Panel of Physicians and Chiropractors. Any medical costs related to your industrial injury or OD will be paid by your insurer.     Temporary Total Disability (TTD): If your doctor has certified that you are unable to work for a period of at least 5 consecutive days, or 5 cumulative days in a 20-day period, or places restrictions on you that your employer does not accommodate, you may be entitled to TTD compensation.     Temporary Partial Disability (TPD): If the wage you receive upon reemployment is less than the compensation for TTD to which you are entitled, the insurer may be required to pay you TPD compensation to make up the difference. TPD can only be paid for a maximum of 24 months.     Permanent Partial Disability (PPD): When your medical condition is stable and there is an indication of a PPD as a result of your injury or OD, within 30 days, your insurer must arrange for an evaluation by a rating physician or chiropractor to determine the degree of your PPD. The amount of your PPD award depends on the date of injury, the results of the PPD evaluation and your age and wage.     Permanent Total Disability (PTD): If you are medically certified by a treating physician or chiropractor as permanently and totally disabled and have been granted a PTD status by your insurer, you are entitled to receive monthly benefits not to exceed 66 2/3% of your average monthly wage. The amount of your PTD payments is subject to reduction if you previously received a PPD award.     Vocational Rehabilitation Services: You may be eligible for vocational rehabilitation services if you are unable to return to the job due to a permanent physical impairment or permanent restrictions as a result of your injury or occupational disease.     Transportation and Per Jumana Reimbursement: You may be  eligible for travel expenses and per debbie associated with medical treatment.     Reopening: You may be able to reopen your claim if your condition worsens after claim closure.     Appeal Process: If you disagree with a written determination issued by the insurer or the insurer does not respond to your request, you may appeal to the Department of Administration, , by following the instructions contained in your determination letter. You must appeal the determination within 70 days from the date of the determination letter at [address], [address], [place] [address], or [address], [place], [place] [address]. If you disagree with the  decision, you may appeal to the Department of Administration, . You must file your appeal within 30 days from the date of the  decision letter at [address], [address], [place], [place] [address], or [address] [facility] [address] [facility] [address], [place], [place] [address]. If you disagree with a decision of an , you may file a petition for judicial review with the District Court. You must do so within 30 days of the Appeal Officer’s decision. You may be represented by an  at your own expense or you may contact the [facility] for possible representation.     [place]  for Injured Workers (NAIW): If you disagree with a  decision, you may request that NAIW represent you without charge at an  Hearing. For information regarding denial of benefits, you may contact the [facility] at: [address], [address], [place] [address], [phone], or [address], [place] [address], [phone]     To File a Complaint with the Division: If you wish to file a complaint with the  of the Division of Industrial Relations (DIR), please contact the Workers’ Compensation Section, [address], [address], [place], [place] [address], telephone [phone], or [address], [address] [facility], [place], [place] [address], telephone [phone].     For assistance with Workers’ Compensation Issues: You may contact the Office of the Governor Consumer Health Assistance, [address] [place] [address], [address], [place], [place] [address], Toll Free 1-613.235.8105, Web site: [email or web address].[email or web address]., E-mail [email or web address].              __________________________________________________________________                              ___________________         Employee Name / Signature                                                                                                                     Date                                                                                                                                                                                       D-2 (rev. 01/20)

## 2020-09-09 NOTE — PROGRESS NOTES
Subjective:      Skinny Mackey is a 30 y.o. male who presents with Shoulder Injury (RT shoulder injury )      HPI:  DOI: 9/9/2020  JOSE: The patient was at work today at [employer] when the injury occurred.  He was walking carrying something in his left hand and swinging his right shoulder.  He felt a pop and a sharp pain in his right shoulder.  The sharp pain gradually subsided and turned to a dull ache.  Currently he denies any numbness tingling weakness or loss of range of motion to the shoulder.  He states his pain has greatly improved and he is in minimal pain at this time.  He informs me his boss recommended he come into the clinic and be checked out today.  There was no traumatic event leading to this injury.  Patient informs me he does have a past medical history significant for right shoulder dislocation back in 2015.  He has had no previous surgeries on the shoulder.  The patient is right-handed.  He denies any second job.    PMH:   No pertinent past medical history to this problem  MEDS:  Medications were reviewed in EMR  ALLERGIES:  Allergies were reviewed in EMR  SOCHX:  Works at [employer]  FH:   No pertinent family history to this problem    Review of systems:  Constitutional: Alert, no acute distress, normal appearance  Cardiovascular: Regular rate and rhythm  Pulmonary: Normal breath sounds no wheezing  Musculoskeletal: Right shoulder pain            Objective:     /80   Pulse 82   Temp 36.9 °C (98.4 °F) (Temporal)   Resp 14   Ht 1.829 m (6')   Wt 72.6 kg (160 lb)   SpO2 98%   BMI 21.70 kg/m²      Physical Exam    Constitutional: Pt is oriented to person, place, and time.  Appears well-developed and well-nourished. No distress.   HENT: WNL  Mouth/Throat: Oropharynx is clear and moist.   Eyes: Conjunctivae are normal.   Cardiovascular: Normal rate.    Pulmonary/Chest: Effort normal.   Musculoskeletal: Right shoulder: No obvious deformities, effusion, ecchymosis or  erythema.  No tenderness to palpation over any of the bony prominences.  Slight tenderness to palpation over the lateral glenohumeral joint.  Patient has full shoulder flexion/extension/internal and external rotation.  Strength is 5/5 to shoulder flexion/extension/bicep flexion/abduction.  Bilateral upper extremity sensation is full and intact.  Speeds test negative.  Empty can test negative.  Apprehension test negative.  Neurological: Pt is alert and oriented to person, place, and time. Coordination normal.   Skin: Skin is warm. Pt is not diaphoretic. No erythema.   Psychiatric: Pt has a normal mood and affect.  Behavior is normal.          Assessment/Plan:        1. Impingement syndrome of right shoulder    May use Tylenol and ibuprofen as needed for pain and inflammation.  Recommended icing 3-4 times a day for 10 to 15 minutes for the next 2 to 3 days.  Recommended stretching and gentle range of motion exercises.  No physical restrictions or limitations.  Follow-up in clinic if symptoms worsen or persist.

## 2021-06-06 ENCOUNTER — HOSPITAL ENCOUNTER (EMERGENCY)
Facility: MEDICAL CENTER | Age: 31
End: 2021-06-06

## 2021-06-06 VITALS
DIASTOLIC BLOOD PRESSURE: 84 MMHG | RESPIRATION RATE: 14 BRPM | SYSTOLIC BLOOD PRESSURE: 135 MMHG | OXYGEN SATURATION: 99 % | HEART RATE: 102 BPM | BODY MASS INDEX: 21.7 KG/M2 | TEMPERATURE: 98 F | HEIGHT: 72 IN

## 2021-06-06 PROCEDURE — 302449 STATCHG TRIAGE ONLY (STATISTIC)

## 2021-06-07 NOTE — ED NOTES
Patients family/significant other arrived to ER [facility], asked about wait time, and told us she would be taking him to the Urgent care. Patient stated he did not want to waste our time, and expressed interest in being seen quicker elsewhere.     Patient encouraged to stay, but was unable to be dissuaded from leaving. Notified to return if symptoms persist or worsen and notified urgent care may not be open.     Signed AMA form and discharged from [facility]

## 2021-10-06 ENCOUNTER — NON-PROVIDER VISIT (OUTPATIENT)
Dept: URGENT CARE | Facility: PHYSICIAN GROUP | Age: 31
End: 2021-10-06

## 2021-10-06 DIAGNOSIS — Z02.1 PRE-EMPLOYMENT DRUG SCREENING: ICD-10-CM

## 2021-10-06 PROCEDURE — 80305 DRUG TEST PRSMV DIR OPT OBS: CPT | Performed by: FAMILY MEDICINE

## 2021-10-27 ENCOUNTER — OFFICE VISIT (OUTPATIENT)
Dept: URGENT CARE | Facility: PHYSICIAN GROUP | Age: 31
End: 2021-10-27

## 2021-10-27 VITALS
BODY MASS INDEX: 20.72 KG/M2 | SYSTOLIC BLOOD PRESSURE: 142 MMHG | DIASTOLIC BLOOD PRESSURE: 78 MMHG | RESPIRATION RATE: 16 BRPM | WEIGHT: 148 LBS | OXYGEN SATURATION: 98 % | HEIGHT: 71 IN

## 2021-10-27 DIAGNOSIS — Z02.89 ENCOUNTER FOR PHYSICAL EXAMINATION RELATED TO EMPLOYMENT: Primary | ICD-10-CM

## 2021-10-27 LAB
APPEARANCE UR: CLEAR
BILIRUB UR STRIP-MCNC: NORMAL MG/DL
COLOR UR AUTO: YELLOW
GLUCOSE UR STRIP.AUTO-MCNC: NORMAL MG/DL
KETONES UR STRIP.AUTO-MCNC: NORMAL MG/DL
LEUKOCYTE ESTERASE UR QL STRIP.AUTO: NORMAL
NITRITE UR QL STRIP.AUTO: NORMAL
PH UR STRIP.AUTO: 6.5 [PH] (ref 5–8)
PROT UR QL STRIP: NORMAL MG/DL
RBC UR QL AUTO: NORMAL
SP GR UR STRIP.AUTO: 1.01
UROBILINOGEN UR STRIP-MCNC: 0.2 MG/DL

## 2021-10-27 PROCEDURE — 81002 URINALYSIS NONAUTO W/O SCOPE: CPT | Performed by: PHYSICIAN ASSISTANT

## 2021-10-27 PROCEDURE — 8915 PR COMPREHENSIVE PHYSICAL: Performed by: PHYSICIAN ASSISTANT

## 2023-04-12 ENCOUNTER — APPOINTMENT (OUTPATIENT)
Dept: RADIOLOGY | Facility: IMAGING CENTER | Age: 33
End: 2023-04-12
Attending: PHYSICIAN ASSISTANT
Payer: COMMERCIAL

## 2023-04-12 ENCOUNTER — OFFICE VISIT (OUTPATIENT)
Dept: URGENT CARE | Facility: PHYSICIAN GROUP | Age: 33
End: 2023-04-12

## 2023-04-12 VITALS
RESPIRATION RATE: 18 BRPM | SYSTOLIC BLOOD PRESSURE: 120 MMHG | DIASTOLIC BLOOD PRESSURE: 88 MMHG | HEIGHT: 71 IN | HEART RATE: 88 BPM | TEMPERATURE: 98.1 F | WEIGHT: 153 LBS | OXYGEN SATURATION: 99 % | BODY MASS INDEX: 21.42 KG/M2

## 2023-04-12 DIAGNOSIS — S29.9XXA RIB INJURY: ICD-10-CM

## 2023-04-12 DIAGNOSIS — S20.212A RIB CONTUSION, LEFT, INITIAL ENCOUNTER: ICD-10-CM

## 2023-04-12 PROCEDURE — 71101 X-RAY EXAM UNILAT RIBS/CHEST: CPT | Mod: TC,FY,LT | Performed by: RADIOLOGY

## 2023-04-12 PROCEDURE — 99213 OFFICE O/P EST LOW 20 MIN: CPT | Performed by: PHYSICIAN ASSISTANT

## 2023-04-12 ASSESSMENT — ENCOUNTER SYMPTOMS
HEMOPTYSIS: 0
SENSORY CHANGE: 0
WEAKNESS: 0
FEVER: 0
FLANK PAIN: 0
SHORTNESS OF BREATH: 0
SPUTUM PRODUCTION: 0
ABDOMINAL PAIN: 0
FOCAL WEAKNESS: 0
TINGLING: 0
MYALGIAS: 1
CHILLS: 0
ANOREXIA: 0
FATIGUE: 0
WHEEZING: 0
VOMITING: 0
COUGH: 0
NAUSEA: 0

## 2023-04-12 NOTE — LETTER
April 12, 2023         Patient: Skinny Mackey   YOB: 1990   Date of Visit: 4/12/2023           To Whom it May Concern:    Skinny Mackey was seen in my clinic on 4/12/2023. He has a rib contusion on the left side. No fractures seen. He can resume normal work without restrictions.     If you have any questions or concerns, please don't hesitate to call.        Sincerely,           Concha Marti P.A.-C.  Electronically Signed

## 2023-04-12 NOTE — PROGRESS NOTES
"Desiree Mackey is a 32 y.o. male who presents with Rib Injury (LEFT SIDE RIB INJURY WHILE PLAYING WITH KID LAST NIGHT. SLIGHT BRUISING AND TENDER. )            Rib Injury  This is a new problem. The current episode started yesterday (Patient was playing with daughter last night and fell onto the base board of daughter's bed. He landed on his left rib). The problem occurs constantly. The problem has been waxing and waning. Associated symptoms include myalgias. Pertinent negatives include no abdominal pain, anorexia, chest pain, chills, coughing, fatigue, fever, nausea, rash, urinary symptoms (no hematuria), vomiting or weakness. Exacerbated by: palpation of area, deep breathing, twisting, movement. He has tried nothing for the symptoms.       No past medical history on file.        No past surgical history on file.      No family history on file.    Allergies:  Penicillins      Medications, Allergies, and current problem list reviewed today in Epic    Review of Systems   Constitutional:  Negative for chills, fatigue and fever.   Respiratory:  Negative for cough, hemoptysis, sputum production, shortness of breath and wheezing.    Cardiovascular:  Negative for chest pain and leg swelling.   Gastrointestinal:  Negative for abdominal pain, anorexia, nausea and vomiting.   Genitourinary:  Negative for dysuria, flank pain and hematuria.   Musculoskeletal:  Positive for myalgias.        Rib pain left side    Skin:  Negative for rash.   Neurological:  Negative for tingling, sensory change, focal weakness and weakness.      All other systems reviewed and are negative.         Objective     /88   Pulse 88   Temp 36.7 °C (98.1 °F) (Temporal)   Resp 18   Ht 1.803 m (5' 11\")   Wt 69.4 kg (153 lb)   SpO2 99%   BMI 21.34 kg/m²      Physical Exam  Constitutional:       General: He is not in acute distress.     Appearance: Normal appearance. He is not ill-appearing.   HENT:      Head: Normocephalic and " atraumatic.   Eyes:      Conjunctiva/sclera: Conjunctivae normal.   Cardiovascular:      Rate and Rhythm: Normal rate and regular rhythm.   Pulmonary:      Effort: Pulmonary effort is normal. No respiratory distress.      Breath sounds: No stridor. No wheezing.   Chest:      Chest wall: Tenderness present.      Comments: TTP over lateral ribs over axillary line. No crepitus. No edema or ecchymosis. No rib retraction.   Abdominal:      General: Abdomen is flat.      Palpations: Abdomen is soft.      Tenderness: There is no abdominal tenderness. There is no right CVA tenderness or left CVA tenderness.   Skin:     General: Skin is warm and dry.      Findings: No bruising or erythema.   Neurological:      General: No focal deficit present.      Mental Status: He is alert and oriented to person, place, and time.   Psychiatric:         Mood and Affect: Mood normal.         Behavior: Behavior normal.         Thought Content: Thought content normal.         Judgment: Judgment normal.                4/12/2023 4:11 PM     HISTORY/REASON FOR EXAM:  Pain Following Trauma; fell onto base board of bed  Rib pain, injury     TECHNIQUE/EXAM DESCRIPTION AND NUMBER OF VIEWS:  5 images of the LEFT ribs and chest.     COMPARISON: None     FINDINGS:  No displaced fracture is seen.     No pneumothorax.     No consolidation.     Normal cardiomediastinal contours.     IMPRESSION:     Normal rib series.           Assessment & Plan        1. Rib contusion, left, initial encounter  [id number] (WITH 1-VIEW CXR) LEFT          - [id number] (WITH 1-VIEW CXR) LEFT; Future     X-ray reviewed. Agree with RAD above.  Rest, heat, ice.   OTC analgesics.  Work note provided.    Differential diagnoses, Supportive care, and indications for immediate follow-up discussed with patient.   Pathogenesis of diagnosis discussed including typical length and natural progression.   Instructed to return to clinic or nearest emergency department for any  change in condition, further concerns, or worsening of symptoms.        The patient demonstrated a good understanding and agreed with the treatment plan.      Concha Marti P.A.-C.

## 2025-06-19 ENCOUNTER — APPOINTMENT (OUTPATIENT)
Dept: URGENT CARE | Facility: PHYSICIAN GROUP | Age: 35
End: 2025-06-19
Payer: COMMERCIAL